# Patient Record
Sex: MALE | Race: WHITE | NOT HISPANIC OR LATINO | Employment: FULL TIME | URBAN - METROPOLITAN AREA
[De-identification: names, ages, dates, MRNs, and addresses within clinical notes are randomized per-mention and may not be internally consistent; named-entity substitution may affect disease eponyms.]

---

## 2017-03-29 ENCOUNTER — APPOINTMENT (OUTPATIENT)
Dept: LAB | Facility: HOSPITAL | Age: 32
End: 2017-03-29
Attending: UROLOGY
Payer: COMMERCIAL

## 2017-03-29 ENCOUNTER — TRANSCRIBE ORDERS (OUTPATIENT)
Dept: LAB | Facility: HOSPITAL | Age: 32
End: 2017-03-29

## 2017-03-29 DIAGNOSIS — Z31.41 FERTILITY TESTING: Primary | ICD-10-CM

## 2017-03-29 DIAGNOSIS — Z31.41 FERTILITY TESTING: ICD-10-CM

## 2017-03-29 LAB
COLLECTION DATE & TIME: NORMAL
LIQUEFACTION TIME SMN: 40 MIN
PH SMN: 8.1 [PH] (ref 7.2–8.6)
SEX ABSTIN DURATION TIME PATIENT: NORMAL D
SPECIMEN VOL SMN: 2.4 ML (ref 1–5)
SPERM # SMN: 204 MILLION/EJACULATION (ref 40–1000)
SPERM AMORPHOUS HEAD NFR SMN: 20 %
SPERM MORPH PNL SMN: 18
SPERM MOTILE SMN QL MICRO: 80 %
SPERM MOTILITY SCORE SMN AUTO: 3 (ref 2–4)
SPERM PROG NFR SMN: 3 % (ref 2–4)
SPERM SMN: 0 %
SPERM SMN: 10 %
SPERM SMN: 15 %
SPERM SMN: 3 %
SPERM SMN: 48 % (ref 0–50)
SPERM SMN: 52 % (ref 50–100)
SPERM SMN: 85 /ML (ref 20–999)
SPERM SMN: NORMAL
VISC SMN: 4 CP (ref 3–4)
WBC SMN QL: 0 "HPF"

## 2017-03-29 PROCEDURE — 89320 SEMEN ANAL VOL/COUNT/MOT: CPT

## 2021-05-08 ENCOUNTER — OFFICE VISIT (OUTPATIENT)
Dept: FAMILY MEDICINE CLINIC | Facility: CLINIC | Age: 36
End: 2021-05-08
Payer: COMMERCIAL

## 2021-05-08 VITALS
SYSTOLIC BLOOD PRESSURE: 112 MMHG | DIASTOLIC BLOOD PRESSURE: 70 MMHG | BODY MASS INDEX: 24.43 KG/M2 | HEIGHT: 66 IN | WEIGHT: 152 LBS

## 2021-05-08 DIAGNOSIS — L30.9 ECZEMA, UNSPECIFIED TYPE: Primary | ICD-10-CM

## 2021-05-08 PROCEDURE — 99203 OFFICE O/P NEW LOW 30 MIN: CPT | Performed by: FAMILY MEDICINE

## 2021-05-08 RX ORDER — CLOTRIMAZOLE AND BETAMETHASONE DIPROPIONATE 10; .64 MG/G; MG/G
CREAM TOPICAL 2 TIMES DAILY
Qty: 15 G | Refills: 1 | Status: SHIPPED | OUTPATIENT
Start: 2021-05-08 | End: 2022-04-25 | Stop reason: ALTCHOICE

## 2021-05-08 NOTE — PROGRESS NOTES
Assessment/Plan:  Eczema  I believe the most likely cause of his rash is eczematous  Let likely tinea corporis  Does not appear to represent any neoplastic condition  Is going to use Lotrisone cream b i d  for the next 10-14 days  If the rash is not resolved at that point he is asked to call for re-evaluation  Will call sooner as needed  He agrees with this plan  Diagnoses and all orders for this visit:    Eczema, unspecified type  -     clotrimazole-betamethasone (LOTRISONE) 1-0 05 % cream; Apply topically 2 (two) times a day          Subjective:   Chief Complaint   Patient presents with    Rash     Rash left arm for approx 1 month        Patient ID: Lorenza Damian is a 29 y o  male  I have had a rash on my arm for a month  Neosporin seems helpful  Itchy  No history of eczenma, childhood asthma, No constitutional sx  HPI  The patient is a 31-year-old male who is new to the practice  He states that he has a rash on his right forearm which has been present for about a month  He has been using Neosporin on it which seems helpful at times  It is pruritic  He does have a history of childhood asthma but no history of eczema  He has no fevers chills or constitutional symptoms  He has a couple of cats but none have skin conditions that he is aware of  He is aware of no other rash on his body  No joint complaints  The following portions of the patient's history were reviewed and updated as appropriate: allergies, current medications, past family history, past medical history, past social history, past surgical history and problem list     ROS     Limited and pertinent review of systems is noted in the HPI  Objective:    Physical Exam   Constitutional: He is oriented to person, place, and time  He appears well-developed and well-nourished  Musculoskeletal:         General: No tenderness, deformity or edema  Comments: No obvious evidence of synovitis     Neurological: He is alert and oriented to person, place, and time  Skin: Rash noted  There is erythema  He has an erythematous, annular patch on his left dorsal forearm  It is about 3 to 3 5 cm in diameter  There is no raised/advancing border  There is some minimal scale noted  No other skin rash last eruption is noted  Psychiatric: He has a normal mood and affect  Judgment and thought content normal    Nursing note and vitals reviewed

## 2021-05-08 NOTE — ASSESSMENT & PLAN NOTE
I believe the most likely cause of his rash is eczematous  Let likely tinea corporis  Does not appear to represent any neoplastic condition  Is going to use Lotrisone cream b i d  for the next 10-14 days  If the rash is not resolved at that point he is asked to call for re-evaluation  Will call sooner as needed  He agrees with this plan

## 2021-05-11 ENCOUNTER — TELEPHONE (OUTPATIENT)
Dept: FAMILY MEDICINE CLINIC | Facility: CLINIC | Age: 36
End: 2021-05-11

## 2022-02-14 ENCOUNTER — OFFICE VISIT (OUTPATIENT)
Dept: FAMILY MEDICINE CLINIC | Facility: CLINIC | Age: 37
End: 2022-02-14
Payer: COMMERCIAL

## 2022-02-14 VITALS
BODY MASS INDEX: 25.07 KG/M2 | HEIGHT: 66 IN | DIASTOLIC BLOOD PRESSURE: 82 MMHG | WEIGHT: 156 LBS | SYSTOLIC BLOOD PRESSURE: 112 MMHG

## 2022-02-14 DIAGNOSIS — F40.01 PANIC DISORDER WITH AGORAPHOBIA AND MILD PANIC ATTACKS: Primary | ICD-10-CM

## 2022-02-14 PROCEDURE — 99214 OFFICE O/P EST MOD 30 MIN: CPT | Performed by: FAMILY MEDICINE

## 2022-02-14 PROCEDURE — 3008F BODY MASS INDEX DOCD: CPT | Performed by: FAMILY MEDICINE

## 2022-02-14 RX ORDER — ESCITALOPRAM OXALATE 5 MG/1
5 TABLET ORAL DAILY
Qty: 30 TABLET | Refills: 0 | Status: SHIPPED | OUTPATIENT
Start: 2022-02-14 | End: 2022-03-28 | Stop reason: DRUGHIGH

## 2022-02-14 NOTE — ASSESSMENT & PLAN NOTE
The patient is a 78-year-old male who presents today with panic disorder with some degree of agoraphobia  We discussed this at length today  We agreed to a trial of Lexapro 5 mg daily  We discussed potential side effects including nausea, dizziness, insomnia or possible suicidal ideation  We discussed little take several days to weeks for this medication to become effective  We will see him back in 3 weeks for re-evaluation  In the meantime will call or seek more urgent medical attention sooner  He agrees with this plan

## 2022-02-14 NOTE — PROGRESS NOTES
Assessment/Plan:  Panic disorder with agoraphobia and mild panic attacks  The patient is a 50-year-old male who presents today with panic disorder with some degree of agoraphobia  We discussed this at length today  We agreed to a trial of Lexapro 5 mg daily  We discussed potential side effects including nausea, dizziness, insomnia or possible suicidal ideation  We discussed little take several days to weeks for this medication to become effective  We will see him back in 3 weeks for re-evaluation  In the meantime will call or seek more urgent medical attention sooner  He agrees with this plan  Diagnoses and all orders for this visit:    Panic disorder with agoraphobia and mild panic attacks  -     escitalopram (LEXAPRO) 5 mg tablet; Take 1 tablet (5 mg total) by mouth daily          Subjective:   Chief Complaint   Patient presents with    Anxiety     Restart medication  Had taken xanax in the past         Patient ID: Inga Reed is a 29 y o  male  I am here for my anxiety  I have had it my whole life  Panic diagnosed 2015  Stopped due to fatigue  I have been living with it  2 kids now  More work responsibilities  Daily lack of energy, motivation  Anxiety, mild somatic sx  Agoraphobia  Social avoidance at time  Mild depression  No suicidal ideation  HPI  The patient is a 50-year-old male who presents today with concerns related to anxiety  He states that I have had my whole life  It was formally diagnosed in 2015  He was given alprazolam to use as needed  He states that it was effective to some extent but he stopped it due to fatigue  He states that since then he has been living with it  Now he has 2 children as well as increased work responsibilities and he is finding his anxiety more bothersome  He notes a lack of energy and motivation  He has increased nervousness with some degree of agoraphobia    He has social avoidance, possibly mild depression though he is not suicidal   He has mild somatic symptoms  The following portions of the patient's history were reviewed and updated as appropriate: allergies, current medications, past family history, past medical history, past social history, past surgical history and problem list     ROS    Per the HPI  Objective:    Physical Exam  Vitals and nursing note reviewed  Constitutional:       Appearance: Normal appearance  Neck:      Comments: Thyroid without enlargement or nodularity  Cardiovascular:      Rate and Rhythm: Normal rate and regular rhythm  Pulmonary:      Effort: Pulmonary effort is normal       Breath sounds: Normal breath sounds  Musculoskeletal:      Right lower leg: No edema  Left lower leg: No edema  Neurological:      Mental Status: He is alert and oriented to person, place, and time  Psychiatric:         Mood and Affect: Mood normal          Thought Content:  Thought content normal          Judgment: Judgment normal

## 2022-03-07 ENCOUNTER — TELEMEDICINE (OUTPATIENT)
Dept: FAMILY MEDICINE CLINIC | Facility: CLINIC | Age: 37
End: 2022-03-07
Payer: COMMERCIAL

## 2022-03-07 VITALS — WEIGHT: 156.6 LBS | HEIGHT: 66 IN | BODY MASS INDEX: 25.17 KG/M2

## 2022-03-07 DIAGNOSIS — F40.01 PANIC DISORDER WITH AGORAPHOBIA AND MILD PANIC ATTACKS: Primary | ICD-10-CM

## 2022-03-07 PROCEDURE — 99213 OFFICE O/P EST LOW 20 MIN: CPT | Performed by: FAMILY MEDICINE

## 2022-03-07 RX ORDER — ESCITALOPRAM OXALATE 10 MG/1
10 TABLET ORAL DAILY
Qty: 30 TABLET | Refills: 1 | Status: SHIPPED | OUTPATIENT
Start: 2022-03-07 | End: 2022-03-28 | Stop reason: SDUPTHER

## 2022-03-07 NOTE — ASSESSMENT & PLAN NOTE
Lexapro 5 mg did not produce any significant improvement  We are going to have him increase his dose to 10 mg and will follow-up virtually in 3-4 weeks  Hopefully ill start seeing some improvement  If not will discuss or other alternatives such as change of agent, cognitive therapy X cetera  He agrees with this plan

## 2022-03-07 NOTE — PROGRESS NOTES
Virtual Regular Visit    Verification of patient location:    Patient is located in the following state in which I hold an active license [place]      Assessment/Plan:    Problem List Items Addressed This Visit        Other    Panic disorder with agoraphobia and mild panic attacks - Primary     Lexapro 5 mg did not produce any significant improvement  We are going to have him increase his dose to 10 mg and will follow-up virtually in 3-4 weeks  Hopefully ill start seeing some improvement  If not will discuss or other alternatives such as change of agent, cognitive therapy X cetera  He agrees with this plan  Relevant Medications    escitalopram (Lexapro) 10 mg tablet               Reason for visit is   Chief Complaint   Patient presents with    Anxiety     Started lexapro 3 weeks ago  Encounter provider Katheryn Chang MD    Provider located at [address]  [address] [facility] [id number]      Recent Visits  No visits were found meeting these conditions  Showing recent visits within past 7 days and meeting all other requirements  Today's Visits  Date Type Provider Dept   03/07/22 Telemedicine Katheryn Chang MD [facility]   Showing today's visits and meeting all other requirements  Future Appointments  No visits were found meeting these conditions  Showing future appointments within next 150 days and meeting all other requirements       The patient was identified by name and date of birth  Jaci Dalal was informed that this is a telemedicine visit and that the visit is being conducted through [facility] and patient was informed that this is not a secure, HIPAA-compliant platform  He agrees to proceed     My office door was closed  No one else was in the room  He acknowledged consent and understanding of privacy and security of the video platform  The patient has agreed to participate and understands they can discontinue the visit at any time      Patient is aware this is a billable service  Subjective  Eva Ross is a 40 y o  male routine for virtual follow-up of anxiety/panic  HPI   See documentation in the other note associated with this date  History reviewed  No pertinent past medical history  Past Surgical History:   Procedure Laterality Date    APPENDECTOMY  2011    HERNIA REPAIR  11/2002       Current Outpatient Medications   Medication Sig Dispense Refill    clotrimazole-betamethasone (LOTRISONE) 1-0 05 % cream Apply topically 2 (two) times a day (Patient not taking: Reported on 2/14/2022 ) 15 g 1    escitalopram (Lexapro) 10 mg tablet Take 1 tablet (10 mg total) by mouth daily 30 tablet 1    escitalopram (LEXAPRO) 5 mg tablet Take 1 tablet (5 mg total) by mouth daily 30 tablet 0     No current facility-administered medications for this visit  No Known Allergies    Review of Systems    Video Exam    Vitals:    03/07/22 1430   Weight: 71 kg (156 lb 9 6 oz)   Height: 5' 6" (1 676 m)       Physical Exam     I spent 10 minutes directly with the patient during this visit    VIRTUAL VISIT DISCLAIMER      Eva Ross verbally agrees to participate in [employer]  Pt is aware that [employer] could be limited without vital signs or the ability to perform a full hands-on physical exam  [place] understands he or the provider may request at any time to terminate the video visit and request the patient to seek care or treatment in person

## 2022-03-07 NOTE — PROGRESS NOTES
Assessment/Plan:  Panic disorder with agoraphobia and mild panic attacks  Lexapro 5 mg did not produce any significant improvement  We are going to have him increase his dose to 10 mg and will follow-up virtually in 3-4 weeks  Hopefully ill start seeing some improvement  If not will discuss or other alternatives such as change of agent, cognitive therapy X cetera  He agrees with this plan  Diagnoses and all orders for this visit:    Panic disorder with agoraphobia and mild panic attacks  -     escitalopram (Lexapro) 10 mg tablet; Take 1 tablet (10 mg total) by mouth daily          Subjective:   Chief Complaint   Patient presents with    Anxiety     Started lexapro 3 weeks ago  Patient ID: Kanikamyra Lincoln is a 40 y o  male  No real improvement in symptoms as of yet  SE first day  Myalgias, fatigue  Sleep fine  Day to day activities  HPI  The patient is a 42-year-old male who presents virtually for follow-up of panic disorder with agoraphobia  After our last visit he started Lexapro 5 mg daily  He noted the 1st day he had some muscle aches and 1st couple of days he had some fatigue which has resolved  Unfortunately he he has seen no improvement in his anxiety symptoms presently  He has been sleeping fine, energy level appetite are normal presently  No side effects such as cardiovascular symptomatology noted  The following portions of the patient's history were reviewed and updated as appropriate: allergies, current medications, past family history, past medical history, past social history, past surgical history and problem list     ROS    Per the HPI  Objective:    Physical Exam  Pulmonary:      Effort: Pulmonary effort is normal    Neurological:      Mental Status: He is alert  Psychiatric:         Thought Content:  Thought content normal          Judgment: Judgment normal       Comments: Mildly anxious appearing

## 2022-03-28 ENCOUNTER — OFFICE VISIT (OUTPATIENT)
Dept: FAMILY MEDICINE CLINIC | Facility: CLINIC | Age: 37
End: 2022-03-28
Payer: COMMERCIAL

## 2022-03-28 VITALS
DIASTOLIC BLOOD PRESSURE: 78 MMHG | SYSTOLIC BLOOD PRESSURE: 118 MMHG | BODY MASS INDEX: 25.07 KG/M2 | WEIGHT: 156 LBS | HEIGHT: 66 IN

## 2022-03-28 DIAGNOSIS — F40.01 PANIC DISORDER WITH AGORAPHOBIA AND MILD PANIC ATTACKS: ICD-10-CM

## 2022-03-28 PROCEDURE — 99214 OFFICE O/P EST MOD 30 MIN: CPT | Performed by: FAMILY MEDICINE

## 2022-03-28 PROCEDURE — 3008F BODY MASS INDEX DOCD: CPT | Performed by: FAMILY MEDICINE

## 2022-03-28 RX ORDER — BUSPIRONE HYDROCHLORIDE 5 MG/1
5 TABLET ORAL 2 TIMES DAILY
Qty: 60 TABLET | Refills: 1 | Status: SHIPPED | OUTPATIENT
Start: 2022-03-28

## 2022-03-28 RX ORDER — ESCITALOPRAM OXALATE 10 MG/1
10 TABLET ORAL DAILY
Qty: 30 TABLET | Refills: 1 | Status: SHIPPED | OUTPATIENT
Start: 2022-03-28 | End: 2022-04-25

## 2022-03-28 NOTE — ASSESSMENT & PLAN NOTE
The patient has not seen any improvement with Lexapro after increase from 5-10 mg  We are going to have him continue with his Lexapro 10 for now  We are also going to add BuSpar 5 b i d     We are going to ask him to return in 3-4 weeks to gauge the effectiveness of this addition  He is asked call sooner as needed  He agrees with this plan

## 2022-03-28 NOTE — PROGRESS NOTES
Assessment/Plan:  Panic disorder with agoraphobia and mild panic attacks  The patient has not seen any improvement with Lexapro after increase from 5-10 mg  We are going to have him continue with his Lexapro 10 for now  We are also going to add BuSpar 5 b i d     We are going to ask him to return in 3-4 weeks to gauge the effectiveness of this addition  He is asked call sooner as needed  He agrees with this plan  CBC CMP lipids and TSH     Diagnoses and all orders for this visit:    Panic disorder with agoraphobia and mild panic attacks  -     busPIRone (BUSPAR) 5 mg tablet; Take 1 tablet (5 mg total) by mouth 2 (two) times a day  -     escitalopram (Lexapro) 10 mg tablet; Take 1 tablet (10 mg total) by mouth daily          Subjective:   Chief Complaint   Patient presents with    Follow-up     Med check        Patient ID: Cynthia Treadwell is a 40 y o  male  No improvement in symptoms after increase in Lexapro, no worse  Sleep unchanged [place] thoughts on awakening, interest level not high, energy varies, concentration lower, appetite ok, no suicidal ideation  Kids are 4/ 1/2  HPI  The patient is a 64-year-old male presents today for follow-up of panic disorder with agoraphobia  He has seen no improvement in symptoms after increasing his Lexapro from 5-10  He does not feel any worse  He continues with early a m  awakenings with racing thoughts at times  His interest level is not high and his energy level varies  He is not able to concentrate well though his appetite been fairly good  He has no suicidal ideation  He denies any cardiovascular pulmonary or complaint  The following portions of the patient's history were reviewed and updated as appropriate: allergies, current medications, past family history, past medical history, past social history, past surgical history and problem list     ROS    Per the HPI  Objective:    Physical Exam  Constitutional:       Appearance: Normal appearance  Neurological:      General: No focal deficit present  Mental Status: He is alert and oriented to person, place, and time     Psychiatric:      Comments: Anxious appearance which is unchanged

## 2022-04-06 ENCOUNTER — CLINICAL SUPPORT (OUTPATIENT)
Dept: FAMILY MEDICINE CLINIC | Facility: CLINIC | Age: 37
End: 2022-04-06

## 2022-04-06 DIAGNOSIS — F40.01 PANIC DISORDER WITH AGORAPHOBIA AND MILD PANIC ATTACKS: Primary | ICD-10-CM

## 2022-04-06 DIAGNOSIS — Z13.220 SCREENING FOR LIPID DISORDERS: ICD-10-CM

## 2022-04-07 LAB
ALBUMIN SERPL-MCNC: 4.5 G/DL (ref 3.6–5.1)
ALBUMIN/GLOB SERPL: 2.6 (CALC) (ref 1–2.5)
ALP SERPL-CCNC: 47 U/L (ref 36–130)
ALT SERPL-CCNC: 15 U/L (ref 9–46)
AST SERPL-CCNC: 16 U/L (ref 10–40)
BILIRUB SERPL-MCNC: 0.5 MG/DL (ref 0.2–1.2)
BUN SERPL-MCNC: 15 MG/DL (ref 7–25)
BUN/CREAT SERPL: ABNORMAL (CALC) (ref 6–22)
CALCIUM SERPL-MCNC: 9.2 MG/DL (ref 8.6–10.3)
CHLORIDE SERPL-SCNC: 107 MMOL/L (ref 98–110)
CHOLEST SERPL-MCNC: 133 MG/DL
CHOLEST/HDLC SERPL: 2.6 (CALC)
CO2 SERPL-SCNC: 29 MMOL/L (ref 20–32)
CREAT SERPL-MCNC: 0.71 MG/DL (ref 0.6–1.35)
ERYTHROCYTE [DISTWIDTH] IN BLOOD BY AUTOMATED COUNT: 12.4 % (ref 11–15)
GLOBULIN SER CALC-MCNC: 1.7 G/DL (CALC) (ref 1.9–3.7)
GLUCOSE SERPL-MCNC: 85 MG/DL (ref 65–99)
HCT VFR BLD AUTO: 43.6 % (ref 38.5–50)
HDLC SERPL-MCNC: 51 MG/DL
HGB BLD-MCNC: 15.3 G/DL (ref 13.2–17.1)
LDLC SERPL CALC-MCNC: 68 MG/DL (CALC)
MCH RBC QN AUTO: 31.7 PG (ref 27–33)
MCHC RBC AUTO-ENTMCNC: 35.1 G/DL (ref 32–36)
MCV RBC AUTO: 90.3 FL (ref 80–100)
NONHDLC SERPL-MCNC: 82 MG/DL (CALC)
PLATELET # BLD AUTO: 167 THOUSAND/UL (ref 140–400)
PMV BLD REES-ECKER: 11.2 FL (ref 7.5–12.5)
POTASSIUM SERPL-SCNC: 4.4 MMOL/L (ref 3.5–5.3)
PROT SERPL-MCNC: 6.2 G/DL (ref 6.1–8.1)
RBC # BLD AUTO: 4.83 MILLION/UL (ref 4.2–5.8)
SL AMB EGFR AFRICAN AMERICAN: 140 ML/MIN/1.73M2
SL AMB EGFR NON AFRICAN AMERICAN: 121 ML/MIN/1.73M2
SODIUM SERPL-SCNC: 142 MMOL/L (ref 135–146)
TRIGL SERPL-MCNC: 61 MG/DL
TSH SERPL-ACNC: 1.47 MIU/L (ref 0.4–4.5)
WBC # BLD AUTO: 4.7 THOUSAND/UL (ref 3.8–10.8)

## 2022-04-24 DIAGNOSIS — F40.01 PANIC DISORDER WITH AGORAPHOBIA AND MILD PANIC ATTACKS: ICD-10-CM

## 2022-04-25 ENCOUNTER — OFFICE VISIT (OUTPATIENT)
Dept: FAMILY MEDICINE CLINIC | Facility: CLINIC | Age: 37
End: 2022-04-25
Payer: COMMERCIAL

## 2022-04-25 VITALS
SYSTOLIC BLOOD PRESSURE: 118 MMHG | HEIGHT: 66 IN | WEIGHT: 154 LBS | DIASTOLIC BLOOD PRESSURE: 80 MMHG | BODY MASS INDEX: 24.75 KG/M2

## 2022-04-25 DIAGNOSIS — F32.A ANXIETY AND DEPRESSION: Primary | ICD-10-CM

## 2022-04-25 DIAGNOSIS — F40.01 PANIC DISORDER WITH AGORAPHOBIA: ICD-10-CM

## 2022-04-25 DIAGNOSIS — R77.1 HYPOGLOBULINEMIA: ICD-10-CM

## 2022-04-25 DIAGNOSIS — F41.9 ANXIETY AND DEPRESSION: Primary | ICD-10-CM

## 2022-04-25 PROCEDURE — 99214 OFFICE O/P EST MOD 30 MIN: CPT | Performed by: FAMILY MEDICINE

## 2022-04-25 PROCEDURE — 3008F BODY MASS INDEX DOCD: CPT | Performed by: FAMILY MEDICINE

## 2022-04-25 PROCEDURE — 3725F SCREEN DEPRESSION PERFORMED: CPT | Performed by: FAMILY MEDICINE

## 2022-04-25 RX ORDER — ESCITALOPRAM OXALATE 10 MG/1
TABLET ORAL
Qty: 90 TABLET | Refills: 1 | Status: SHIPPED | OUTPATIENT
Start: 2022-04-25 | End: 2022-04-25 | Stop reason: ALTCHOICE

## 2022-04-25 RX ORDER — DULOXETIN HYDROCHLORIDE 30 MG/1
30 CAPSULE, DELAYED RELEASE ORAL DAILY
Qty: 30 CAPSULE | Refills: 0 | Status: SHIPPED | OUTPATIENT
Start: 2022-04-25 | End: 2022-05-23 | Stop reason: SDUPTHER

## 2022-04-25 NOTE — PROGRESS NOTES
Assessment/Plan:  Anxiety and depression  The patient is a 60-year-old male who presents today for follow-up of what was initially felt to represent panic disorder with agoraphobia  Lexapro and BuSpar have not been very effective for him  When we reviewed his symptoms and history again today it does appear that he has a significant degree of depression in addition to his anxiety  We are going to discontinue his Lexapro and BuSpar which have not been effective  We are going to try Cymbalta at a dose of 30 mg daily  We discussed potential side effects and need to call if any suicidal ideation X cetera  Presently denies any suicidal ideation  Will also going to ask Psychiatry to evaluate him  He is asked call in 3 weeks with report of the effectiveness of the Cymbalta or sooner as needed  He agrees  Hypoglobulinemia  We discussed this today  He has no history of recurrent infection  We are going to recommend repeat CMP in the near future  Consider fractionation of globulins if persistent  He agrees  Diagnoses and all orders for this visit:    Anxiety and depression  -     Ambulatory Referral to Psychiatry; Future    Hypoglobulinemia  -     Comprehensive metabolic panel; Future  -     Comprehensive metabolic panel    Panic disorder with agoraphobia  -     DULoxetine (Cymbalta) 30 mg delayed release capsule; Take 1 capsule (30 mg total) by mouth daily          Subjective:   Chief Complaint   Patient presents with    Follow-up     Anxiety        Patient ID: Peterson Watkins is a 40 y o  male  Still no real improvement  I feel generally anxious all the time, no recent panic  No real issues with insomnia, interest up and down, energy varies, concentration so so, Some feelings of guilt, appetite varies, no suicidal ideation  HPI      The patient is a 60-year-old male who states that he has seen no real improvement with the addition of Lexapro and BuSpar to his regimen    He states that he does feel generally anxious all the time though he is not really having issues with panic  He has no real issues with insomnia but he does note that he has had diminished interest, energy and concentration  He has some feelings of guilt at times and his appetite varies  He has no suicidal ideation  The following portions of the patient's history were reviewed and updated as appropriate: allergies, current medications, past family history, past medical history, past social history, past surgical history and problem list     ROS      Objective:    Physical Exam  Constitutional:       Appearance: Normal appearance  Neurological:      Mental Status: He is alert and oriented to person, place, and time  Psychiatric:         Thought Content: Thought content normal          Judgment: Judgment normal       Comments: Dysphoric affect         Depression Screening Follow-up Plan: Patient's depression screening was positive with a PHQ-2 score of 3  Their PHQ-9 score was 12  Patient assessed for underlying major depression  They have no active suicidal ideations  Brief counseling provided and recommend additional follow-up/re-evaluation next office visit  Patient advised to follow-up with PCP for further management

## 2022-04-25 NOTE — ASSESSMENT & PLAN NOTE
The patient is a 28-year-old male who presents today for follow-up of what was initially felt to represent panic disorder with agoraphobia  Lexapro and BuSpar have not been very effective for him  When we reviewed his symptoms and history again today it does appear that he has a significant degree of depression in addition to his anxiety  We are going to discontinue his Lexapro and BuSpar which have not been effective  We are going to try Cymbalta at a dose of 30 mg daily  We discussed potential side effects and need to call if any suicidal ideation X cetera  Presently denies any suicidal ideation  Will also going to ask Psychiatry to evaluate him  He is asked call in 3 weeks with report of the effectiveness of the Cymbalta or sooner as needed  He agrees

## 2022-04-25 NOTE — ASSESSMENT & PLAN NOTE
We discussed this today  He has no history of recurrent infection  We are going to recommend repeat CMP in the near future  Consider fractionation of globulins if persistent  He agrees

## 2022-05-23 DIAGNOSIS — F40.01 PANIC DISORDER WITH AGORAPHOBIA: ICD-10-CM

## 2022-05-23 RX ORDER — DULOXETIN HYDROCHLORIDE 60 MG/1
60 CAPSULE, DELAYED RELEASE ORAL DAILY
Qty: 30 CAPSULE | Refills: 1 | Status: SHIPPED | OUTPATIENT
Start: 2022-05-23 | End: 2022-07-26

## 2022-05-23 RX ORDER — DULOXETIN HYDROCHLORIDE 30 MG/1
30 CAPSULE, DELAYED RELEASE ORAL DAILY
Qty: 30 CAPSULE | Refills: 0 | Status: SHIPPED | OUTPATIENT
Start: 2022-05-23 | End: 2022-05-23 | Stop reason: DRUGHIGH

## 2022-06-21 DIAGNOSIS — F40.01 PANIC DISORDER WITH AGORAPHOBIA AND MILD PANIC ATTACKS: Primary | ICD-10-CM

## 2022-06-21 DIAGNOSIS — F41.9 ANXIETY AND DEPRESSION: ICD-10-CM

## 2022-06-21 DIAGNOSIS — F32.A ANXIETY AND DEPRESSION: ICD-10-CM

## 2022-07-26 DIAGNOSIS — F40.01 PANIC DISORDER WITH AGORAPHOBIA: ICD-10-CM

## 2022-07-26 RX ORDER — DULOXETIN HYDROCHLORIDE 60 MG/1
60 CAPSULE, DELAYED RELEASE ORAL DAILY
Qty: 30 CAPSULE | Refills: 1 | Status: SHIPPED | OUTPATIENT
Start: 2022-07-26

## 2023-03-02 ENCOUNTER — TELEPHONE (OUTPATIENT)
Dept: PSYCHIATRY | Facility: CLINIC | Age: 38
End: 2023-03-02

## 2023-03-02 NOTE — TELEPHONE ENCOUNTER
Called patient regarding referral  Patient stated he is interested but unsure of what his insurance will be soon so currently does not want to be placed on wait list  Patient will call back once he gets insurance set up

## 2024-01-23 ENCOUNTER — TELEPHONE (OUTPATIENT)
Dept: PSYCHIATRY | Facility: CLINIC | Age: 39
End: 2024-01-23

## 2024-01-23 NOTE — TELEPHONE ENCOUNTER
Patient has been added to the [facility] wait list without a referral.    Insurance: BCBS (insurance not available during call)  Insurance Type:    Commercial [x]   Medicaid []   [facility] (if applicable)   Medicare []  Location Preference: [place]  Provider Preference: Female   Virtual: Yes [] No [x]  Were outside resources sent: Yes [] No [x]  Advised the patient to contact his PCP for a referral.

## 2024-07-19 ENCOUNTER — TELEPHONE (OUTPATIENT)
Age: 39
End: 2024-07-19

## 2024-07-19 NOTE — TELEPHONE ENCOUNTER
Contacted patient off of Medication Management  to verify needs of services in attempts to offer patient an appointment. LVM for patient to contact intake dept  in regards to an appointment. 1st attempt.